# Patient Record
Sex: MALE | Race: WHITE
[De-identification: names, ages, dates, MRNs, and addresses within clinical notes are randomized per-mention and may not be internally consistent; named-entity substitution may affect disease eponyms.]

---

## 2019-04-15 ENCOUNTER — HOSPITAL ENCOUNTER (OUTPATIENT)
Dept: HOSPITAL 92 - ERS | Age: 84
Setting detail: OBSERVATION
LOS: 1 days | Discharge: HOME | End: 2019-04-16
Attending: FAMILY MEDICINE | Admitting: FAMILY MEDICINE
Payer: MEDICARE

## 2019-04-15 VITALS — BODY MASS INDEX: 22.1 KG/M2

## 2019-04-15 DIAGNOSIS — I25.10: ICD-10-CM

## 2019-04-15 DIAGNOSIS — I11.0: ICD-10-CM

## 2019-04-15 DIAGNOSIS — E89.0: ICD-10-CM

## 2019-04-15 DIAGNOSIS — Z87.891: ICD-10-CM

## 2019-04-15 DIAGNOSIS — I45.10: ICD-10-CM

## 2019-04-15 DIAGNOSIS — Z95.2: ICD-10-CM

## 2019-04-15 DIAGNOSIS — Z95.1: ICD-10-CM

## 2019-04-15 DIAGNOSIS — Z95.5: ICD-10-CM

## 2019-04-15 DIAGNOSIS — Z86.73: ICD-10-CM

## 2019-04-15 DIAGNOSIS — Z79.82: ICD-10-CM

## 2019-04-15 DIAGNOSIS — Z79.02: ICD-10-CM

## 2019-04-15 DIAGNOSIS — Z88.2: ICD-10-CM

## 2019-04-15 DIAGNOSIS — R07.89: Primary | ICD-10-CM

## 2019-04-15 DIAGNOSIS — Z79.899: ICD-10-CM

## 2019-04-15 DIAGNOSIS — E78.5: ICD-10-CM

## 2019-04-15 DIAGNOSIS — R74.0: ICD-10-CM

## 2019-04-15 DIAGNOSIS — I25.2: ICD-10-CM

## 2019-04-15 DIAGNOSIS — I50.30: ICD-10-CM

## 2019-04-15 LAB
ALBUMIN SERPL BCG-MCNC: 4.2 G/DL (ref 3.4–4.8)
ALP SERPL-CCNC: 137 U/L (ref 40–150)
ALT SERPL W P-5'-P-CCNC: 90 U/L (ref 8–55)
ANION GAP SERPL CALC-SCNC: 15 MMOL/L (ref 10–20)
AST SERPL-CCNC: 199 U/L (ref 5–34)
BASOPHILS # BLD AUTO: 0.1 THOU/UL (ref 0–0.2)
BASOPHILS NFR BLD AUTO: 0.6 % (ref 0–1)
BILIRUB SERPL-MCNC: 2.1 MG/DL (ref 0.2–1.2)
BUN SERPL-MCNC: 20 MG/DL (ref 8.4–25.7)
CALCIUM SERPL-MCNC: 9.8 MG/DL (ref 7.8–10.44)
CHLORIDE SERPL-SCNC: 104 MMOL/L (ref 98–107)
CK SERPL-CCNC: 41 U/L (ref 30–200)
CO2 SERPL-SCNC: 26 MMOL/L (ref 23–31)
CREAT CL PREDICTED SERPL C-G-VRATE: 0 ML/MIN (ref 70–130)
EOSINOPHIL # BLD AUTO: 0.1 THOU/UL (ref 0–0.7)
EOSINOPHIL NFR BLD AUTO: 1.5 % (ref 0–10)
GLOBULIN SER CALC-MCNC: 3.6 G/DL (ref 2.4–3.5)
GLUCOSE SERPL-MCNC: 122 MG/DL (ref 83–110)
HBSAG INDEX: 0.29 S/CO (ref 0–0.99)
HBV SURFACE AB SERPL IA-ACNC: 17.68 MIU/ML
HEP B CORE TOTAL INDEX: 6.67 S/CO (ref 0–0.79)
HEP C INDEX: 0.15 S/CO (ref 0–0.79)
HGB BLD-MCNC: 15.7 G/DL (ref 14–18)
LIPASE SERPL-CCNC: 40 U/L (ref 8–78)
LYMPHOCYTES # BLD: 2 THOU/UL (ref 1.2–3.4)
LYMPHOCYTES NFR BLD AUTO: 23.1 % (ref 21–51)
MCH RBC QN AUTO: 31.3 PG (ref 27–31)
MCV RBC AUTO: 96.5 FL (ref 78–98)
MONOCYTES # BLD AUTO: 0.9 THOU/UL (ref 0.11–0.59)
MONOCYTES NFR BLD AUTO: 10.5 % (ref 0–10)
NEUTROPHILS # BLD AUTO: 5.6 THOU/UL (ref 1.4–6.5)
NEUTROPHILS NFR BLD AUTO: 64.3 % (ref 42–75)
PLATELET # BLD AUTO: 175 THOU/UL (ref 130–400)
POTASSIUM SERPL-SCNC: 3.8 MMOL/L (ref 3.5–5.1)
RBC # BLD AUTO: 5.01 MILL/UL (ref 4.7–6.1)
SODIUM SERPL-SCNC: 141 MMOL/L (ref 136–145)
WBC # BLD AUTO: 8.7 THOU/UL (ref 4.8–10.8)

## 2019-04-15 PROCEDURE — 36415 COLL VENOUS BLD VENIPUNCTURE: CPT

## 2019-04-15 PROCEDURE — 78452 HT MUSCLE IMAGE SPECT MULT: CPT

## 2019-04-15 PROCEDURE — 86803 HEPATITIS C AB TEST: CPT

## 2019-04-15 PROCEDURE — 99285 EMERGENCY DEPT VISIT HI MDM: CPT

## 2019-04-15 PROCEDURE — G0378 HOSPITAL OBSERVATION PER HR: HCPCS

## 2019-04-15 PROCEDURE — A9500 TC99M SESTAMIBI: HCPCS

## 2019-04-15 PROCEDURE — 86704 HEP B CORE ANTIBODY TOTAL: CPT

## 2019-04-15 PROCEDURE — 96361 HYDRATE IV INFUSION ADD-ON: CPT

## 2019-04-15 PROCEDURE — 80053 COMPREHEN METABOLIC PANEL: CPT

## 2019-04-15 PROCEDURE — 84484 ASSAY OF TROPONIN QUANT: CPT

## 2019-04-15 PROCEDURE — 85025 COMPLETE CBC W/AUTO DIFF WBC: CPT

## 2019-04-15 PROCEDURE — 83880 ASSAY OF NATRIURETIC PEPTIDE: CPT

## 2019-04-15 PROCEDURE — 76700 US EXAM ABDOM COMPLETE: CPT

## 2019-04-15 PROCEDURE — 93017 CV STRESS TEST TRACING ONLY: CPT

## 2019-04-15 PROCEDURE — 86706 HEP B SURFACE ANTIBODY: CPT

## 2019-04-15 PROCEDURE — 93005 ELECTROCARDIOGRAM TRACING: CPT

## 2019-04-15 PROCEDURE — 94760 N-INVAS EAR/PLS OXIMETRY 1: CPT

## 2019-04-15 PROCEDURE — 93306 TTE W/DOPPLER COMPLETE: CPT

## 2019-04-15 PROCEDURE — 83690 ASSAY OF LIPASE: CPT

## 2019-04-15 PROCEDURE — 82550 ASSAY OF CK (CPK): CPT

## 2019-04-15 PROCEDURE — 87340 HEPATITIS B SURFACE AG IA: CPT

## 2019-04-15 NOTE — PDOC.FPRHP
- History of Present Illness


Chief Complaint: Chest pain


History of Present Illness: 


 Mr. Murphy is an 89 yo M with PMH CAD, MI, CVA


He woke up this morning and had breakfast. After breakfast, got in car and 

began to have chest pain around 0830. 


Went ot doctor's office and they recommended he go to ER. Central substernal 

constant chest pain, described as burning. No radiation. Pain lasted about 3 

hours. Denies SOB, dizziness, diaphoresis, swelling. Not associated with 

exertion. Was given nitro paste, GI cocktail at [facility] ED and was 

transferred here as his cardiologist, Dr. Harvey works here. Says chest pain 

resolved in ambulance during transfer.





Cardiologist: Dr. Harvey





ED Course: 





Nitro paste, aspirin, GI cocktail, Lasix 40mg IV








- Allergies/Adverse Reactions


 Allergies











Allergy/AdvReac Type Severity Reaction Status Date / Time


 


Sulfa (Sulfonamide Allergy   Verified 04/15/19 19:24





Antibiotics)     














- Home Medications


 











 Medication  Instructions  Recorded  Confirmed  Type


 


Aspirin [Ecotrin Regular Strength] 325 mg PO DAILY 08/11/15 04/15/19 History


 


Cyanocobalamin (Vitamin B-12) 1,000 mcg PO DAILY 08/11/15 04/15/19 History





[Vitamin B-12]    


 


Lisinopril 10 mg PO DAILY 08/11/15 04/15/19 History


 


Ubidecarenone [Co Q-10] 100 mg PO DAILY 08/11/15 04/15/19 History


 


Amlodipine [Norvasc] 5 mg PO DAILY 07/11/17 04/15/19 History


 


Cholecalciferol (Vitamin D3) 1 cap PO DAILY 07/11/17 04/15/19 History





[Vitamin D]    


 


Clopidogrel Bisulfate [Plavix] 75 mg PO DAILY 07/11/17 04/15/19 History


 


Furosemide [Lasix] 20 mg PO DAILY #30 tab 03/31/18 04/15/19 Rx


 


Atorvastatin Calcium 20 mg PO HS 04/15/19 04/15/19 History


 


Metoprolol Succinate [Toprol XL] 25 mg PO DAILY 04/15/19 04/15/19 History


 


Potassium Chloride 10 meq PO DAILY 04/15/19 04/15/19 History


 


Tolterodine Tartrate [Detrol LA] 4 mg PO DAILY 04/15/19 04/15/19 History














- History


PMHx: HTN, HLD, aortic stenosis, MI s/p stents, multiple CVA


 


PSHx: bilateral cataracts, aortic valve replacement





FHx: Unknown, denies


 


Social: No tobacco, alcohol, drug use. Lives in [place] with wife. 


 








- Review of Systems


General: denies: fever/chills, weight/appetite/sleep changes


ENT: denies: nasal congestion


Respiratory: denies: cough, shortness of breath


Cardiovascular: reports: chest pain.  denies: edema, orthopnea


Gastrointestinal: denies: nausea, vomiting, diarrhea, constipation


Genitourinary: denies: dysuria


Skin: denies: rashes


Musculoskeletal: denies: pain, swelling


Neurological: denies: numbness, syncope





- Vital signs








BP: 112/68, Pulse: 76, Resp: 16, Pain: 0, O2 sat: 97 on 3L Oxygen





- Physical Exam


Constitutional: NAD


HEENT: normocephalic and atraumatic, grossly normal vision, grossly normal 

hearing


Neck: supple, trachea midline, no bruits


Chest: no-tender to palpation


Heart: RRR, normal S1/S2, pulses present, no edema


Lungs: CTAB, no respiratory distress


Abdomen: soft, non-tender, bowel sounds present


Musculoskeletal: normal structure, normal tone


Neurological: no focal deficit, other (BLE and BUE 5/5 muscle strength)


Skin: no rash/lesions, good turgor


Heme/Lymphatic: no unusual bruising or bleeding


Psychiatric: normal mood and affect





FMR H&P: Results





- Labs


Result Diagrams: 


 04/15/19 15:39





 04/15/19 15:39


Lab results: 


 











WBC  8.7 thou/uL (4.8-10.8)   04/15/19  15:39    


 


Hgb  15.7 g/dL (14.0-18.0)   04/15/19  15:39    


 


Hct  48.4 % (42.0-52.0)   04/15/19  15:39    


 


MCV  96.5 fL (78.0-98.0)   04/15/19  15:39    


 


Plt Count  175 thou/uL (130-400)   04/15/19  15:39    


 


Neutrophils %  64.3 % (42.0-75.0)   04/15/19  15:39    


 


Sodium  141 mmol/L (136-145)   04/15/19  15:39    


 


Potassium  3.8 mmol/L (3.5-5.1)   04/15/19  15:39    


 


Chloride  104 mmol/L ()   04/15/19  15:39    


 


Carbon Dioxide  26 mmol/L (23-31)   04/15/19  15:39    


 


BUN  20 mg/dL (8.4-25.7)   04/15/19  15:39    


 


Creatinine  1.07 mg/dL (0.7-1.3)   04/15/19  15:39    


 


Glucose  122 mg/dL ()  H  04/15/19  15:39    


 


Calcium  9.8 mg/dL (7.8-10.44)   04/15/19  15:39    


 


Total Bilirubin  2.1 mg/dL (0.2-1.2)  H  04/15/19  15:39    


 


AST  199 U/L (5-34)  H  04/15/19  15:39    


 


ALT  90 U/L (8-55)  H  04/15/19  15:39    


 


Alkaline Phosphatase  137 U/L ()   04/15/19  15:39    


 


Creatine Kinase  41 U/L ()   04/15/19  15:39    


 


B-Natriuretic Peptide  578.7 pg/mL (0-100)  H  04/15/19  15:39    


 


Serum Total Protein  7.8 g/dL (5.8-8.1)   04/15/19  15:39    


 


Albumin  4.2 g/dL (3.4-4.8)   04/15/19  15:39    


 


Lipase  40 U/L (8-78)   04/15/19  15:39    














FMR H&P: A/P





- Problem List


(1) Chest pain


Current Visit: Yes   Status: Acute   Code(s): R07.9 - CHEST PAIN, UNSPECIFIED   

Comment: Pt appears to have had an old PE, also has RV dilatation on 2D echo.  

recent echo done in Feb 2018 at his cardiologist's office showed normal RV.  

Pulmonology consulted for opinion and help with management.   





(2) CAD (coronary artery disease)


Current Visit: Yes   Status: Chronic   Code(s): I25.10 - ATHSCL HEART DISEASE 

OF NATIVE CORONARY ARTERY W/O ANG PCTRS   Comment: stable   





(3) H/O aortic valve replacement


Current Visit: Yes   Status: Chronic   Code(s): Z95.2 - PRESENCE OF PROSTHETIC 

HEART VALVE   Comment: stable   





(4) HLD (hyperlipidemia)


Current Visit: Yes   Status: Chronic   Code(s): E78.5 - HYPERLIPIDEMIA, 

UNSPECIFIED   Comment: on statin   





(5) HTN (hypertension)


Current Visit: Yes   Status: Chronic   Code(s): I10 - ESSENTIAL (PRIMARY) 

HYPERTENSION   


Qualifiers: 


   Hypertension type: essential hypertension   Qualified Code(s): I10 - 

Essential (primary) hypertension   


Comment: Continue to monitor vital signs and titrate antihypertensives as 

needed   





(6) Transaminitis


Current Visit: Yes   Status: Acute   Code(s): R74.0 - NONSPEC ELEV OF LEVELS OF 

TRANSAMNS & LACTIC ACID DEHYDRGNSE   





- Plan





Atypical chest pain, ACS rule out


- Heart score 6


- CP resolved in ambulance to our ED (received nitro, asa and GI cocktail)


- troponin negative x2, continue to trend


- EKG in our ED showed T wave inversion V1-3


- plan for stress test in am


- will discuss case with cardiology in morning





CAD, MI s/p stents


- continue home aspirin, clopidegrel





Valvular disease


- s/p aortic valve replacement


- last echo 03/2018 EF 60-65%, high pulm artery pressure, R ventricular 

enlargement


- 03/18 VASYL ruptured mitral chorda tendonae with severe mitral regurgitation





Elevated BNP


- , (1100 one year ago)


- s/p 40 mg IV lasix in ED, does not appear volume overloaded at this time, 

asymptomatic. Will monitor for signs of volume overload. 





Transaminitis


- , ALT 90, T bili 2.1 - never elevated on previous labs in the system


- will get hepatitis panel and RUQ US





HTN


- continue home lisinopril. Hold metoprolol for stress.





HLD


- continue home atorvastatin











Diet: HH/NPO


Ppx: Lovenox


Dispo: admit to telemetry for observation, likely d/c in 48 hrs





PCP: Heidi


Cardiologist: Wes





Case discussed with Dr. Kovacs.








FMR H&P: Upper Level





- Pertinent history





89 yo male presenting from outside ER with chest pain starting today after 

breakfast. Pt is poor historian and much of PMH comes from past records. Hx of 

HFpEF, severe mitral regurg, severely elevated pulm artery pressures, HTN, 

dyslipidemia, CAD with CABG x 1 in 8/2015, aortic stenosis with aortic valve 

replacement in 8/2015 with Magna bioprosthetic valve. Pt reports he had 

substernal chest pain , constant, unable to describe, with no radiation. Went 

to doctors office who told him to go to ED. Was transported to [facility] ER via 

ambulance and CP resolved in transit. Pt denies SOB, diaphoresis, recent GORE, 

leg swelling, orthopnea. Also describes hx of stent placement approx. 3 months 

ago. Cardiologist is Dr. Harvey. Also had hx of aortic valve replacement per 

patient history.











- Pertinent findings





90/59


HR: 74


RR: 24


100% on RA





ECHO on 3/2018: EF 60-65% with severely dilated left atrium, severe mitral 

regurg, mitral valve prolapse, moderate tricuspid regurg, pulm artery systolic 

pressure approx. 60mmHg and gradients within normal range for prosthetic aortic 

valve


EKG: T wave inversions





GEN: NAD, oriented to person, location, day of week, but not month or year


CARD: RRR, JESSICA 4/6


PULM: CTAB


ABD: soft, nontender


EXT: no cyanosis or edema





All labs reviewed and noted in Dr. Mccarthy note











- Plan


Date/Time: 04/15/19 1644








IAd DO, have evaluated this patient and agree with findings/plan as 

outlined by intern resident. Pertinent changes/additions are listed here.





#atypical chest pain


-heart score of 6


-neg trops initially, will continue to trend


-discuss with cardiologist in the AM, decide whether to get ECHO vs stress


-check Mg, Phos, TSH


- Dx includes GERD, costochondritis


-admit to tele





#elevated liver enzymes


-new onset


-check RUQ US, GGT, hep panel





#HTN


#Hx of CABG, aortic valve replacement, stents


-discuss with cardiology tomorrow





Addendum - Attending





- Attending Attestation


Date/Time: 04/16/19 0101





I personally evaluated the patient and discussed the management with Dr. Hernandez


I agree with the History, Examination, Assessment and Plan documented above 

with any addition or exceptions noted below.


Pleasant 89 yo male with episode angina earlier today spontaneously resolved 

questionably after GI cocktail given at outside hospital s/pAVR 2 v CABG 

patient poor historian admitted r/o ACS troponins negative will consult with Dr Harvey in am for further consideration

## 2019-04-16 VITALS — SYSTOLIC BLOOD PRESSURE: 125 MMHG | DIASTOLIC BLOOD PRESSURE: 65 MMHG

## 2019-04-16 VITALS — TEMPERATURE: 97.8 F

## 2019-04-16 LAB
ALBUMIN SERPL BCG-MCNC: 3.4 G/DL (ref 3.4–4.8)
ALP SERPL-CCNC: 137 U/L (ref 40–150)
ALT SERPL W P-5'-P-CCNC: 135 U/L (ref 8–55)
ANION GAP SERPL CALC-SCNC: 11 MMOL/L (ref 10–20)
AST SERPL-CCNC: 164 U/L (ref 5–34)
BILIRUB SERPL-MCNC: 1 MG/DL (ref 0.2–1.2)
BUN SERPL-MCNC: 19 MG/DL (ref 8.4–25.7)
CALCIUM SERPL-MCNC: 9 MG/DL (ref 7.8–10.44)
CHLORIDE SERPL-SCNC: 106 MMOL/L (ref 98–107)
CO2 SERPL-SCNC: 24 MMOL/L (ref 23–31)
CREAT CL PREDICTED SERPL C-G-VRATE: 52 ML/MIN (ref 70–130)
GLOBULIN SER CALC-MCNC: 3.5 G/DL (ref 2.4–3.5)
GLUCOSE SERPL-MCNC: 91 MG/DL (ref 83–110)
POTASSIUM SERPL-SCNC: 4 MMOL/L (ref 3.5–5.1)
SODIUM SERPL-SCNC: 137 MMOL/L (ref 136–145)
TROPONIN I SERPL DL<=0.01 NG/ML-MCNC: 0.02 NG/ML (ref ?–0.03)

## 2019-04-16 NOTE — ULT
ULTRASOUND ABDOMEN COMPLETE:

 

INDICATIONS:

Transaminitis.

 

TECHNIQUE:

Gray-scale ultrasound evaluation of the liver, gallbladder, spleen, pancreas, common bile duct, kidne
ys, abdominal aorta, and inferior vena cava (IVC).

 

FINDINGS:

No focal hepatic lesion.  The common duct measures 6 mm. which is normal for the patient's age.  Subt
le, low-level echoes of the gallbladder lumen may relate to sludge/non-shadowingstones.  There is no 
overt hydronephrosis of the kidneys.  Hypoechoic foci within each kidney indicate bilateral renal cys
t formation.  No focal abnormality of the spleen is seen.  The pancreas is partially obscured by tanner
l content, which does limit assessment.  Imaged abdominal aorta is nonaneurysmal.  No ascites.

 

IMPRESSION:

1.  No acute abnormalities identified.

 

2.  Probable small volume gallbladder sludge/nonshadowing stones.

 

3.  Bilateral renal cyst formation.  The largest documented cyst is involving the left kidney, slight
ly greater than 7 cm in diameter.

 

POS: NWK

## 2019-04-16 NOTE — PDOC.FM
- Subjective


Subjective: 





Mr. Murphy denies any chest pain, SOB. Resting well in bed with wife at bedside.








- Objective


MAR Reviewed: Yes


Vital Signs & Weight: 


 Vital Signs (12 hours)











  Temp Pulse Resp BP BP Pulse Ox


 


 04/16/19 05:43   71   103/56 L  


 


 04/16/19 04:09  98.7 F  64  18   84/54 L  93 L


 


 04/15/19 19:20       96








 Weight











Weight                         69.49 kg














I&O: 


 











 04/14/19 04/15/19 04/16/19





 06:59 06:59 06:59


 


Intake Total   795


 


Output Total   320


 


Balance   475











Result Diagrams: 


 04/15/19 15:39





 04/16/19 04:20





Phys Exam





- Physical Examination


Constitutional: NAD


Respiratory: clear to auscultation bilateral


Cardiovascular: RRR


click and blowing pansystolic murmur


Gastrointestinal: soft, non-tender


Musculoskeletal: no edema


Neurological: non-focal





Dx/Plan


(1) Chest pain


Code(s): R07.9 - CHEST PAIN, UNSPECIFIED   Status: Acute   





(2) CAD (coronary artery disease)


Code(s): I25.10 - ATHSCL HEART DISEASE OF NATIVE CORONARY ARTERY W/O ANG PCTRS 

  Status: Chronic   





(3) H/O aortic valve replacement


Code(s): Z95.2 - PRESENCE OF PROSTHETIC HEART VALVE   Status: Chronic   





(4) HLD (hyperlipidemia)


Code(s): E78.5 - HYPERLIPIDEMIA, UNSPECIFIED   Status: Chronic   





(5) HTN (hypertension)


Code(s): I10 - ESSENTIAL (PRIMARY) HYPERTENSION   Status: Chronic   


Qualifiers: 


   Hypertension type: essential hypertension   Qualified Code(s): I10 - 

Essential (primary) hypertension   





(6) Transaminitis


Code(s): R74.0 - NONSPEC ELEV OF LEVELS OF TRANSAMNS & LACTIC ACID DEHYDRGNSE   

Status: Acute   





- Plan


Plan: 





Atypical chest pain, ACS rule out


- Heart score 6


- CP resolved in ambulance to our ED (received nitro, asa and GI cocktail)


- troponin negative x3


- EKG in our ED showed T wave inversion V1-3


- plan for stress test in am


- Cardiology, Dr. Harvey consulted





CAD, MI s/p stents


- continue home aspirin, clopidegrel





Valvular disease


- s/p aortic valve replacement


- last echo 03/2018 EF 60-65%, high pulm artery pressure, R ventricular 

enlargement


- 03/18 VASYL ruptured mitral chorda tendonae with severe mitral regurgitation





Elevated BNP


- , (1100 one year ago)


- s/p 40 mg IV lasix in ED, does not appear volume overloaded at this time, 

asymptomatic. 


- continue home lasix





Transaminitis


- , ALT 90, T bili 2.1 - never elevated on previous labs in the system


- Hepatitis panel suggests Hepatitis B in the past but infection cleared. US 

pending.





HTN


- continue home lisinopril. Hold metoprolol for stress.





HLD


- continue home atorvastatin











Diet: HH/NPO


Ppx: Lovenox


Dispo: pending workup











Addendum - Attending





- Attending Attestation


Date/Time: 04/16/19 0218





I personally evaluated the patient and discussed the management with Dr. Hernandez.


I agree with the History, Examination, Assessment and Plan documented above 

with any addition or exceptions noted below.


Atpical CP- Known CAD  EKGs reviewed showing new RBBB changed since 2015.  

Stress test ordered but not done until Dr. Harvey approves (secondary to new 

EKG changes).  Appreciate his recs


h/o AVR and ruptured cordea tendonae- ECHO done this am. Pending read


Transaminitis-improved- hep panel with no acute  infection and RUQ u/s normal.  

Possibly secondary to statin use (however benefit outweights risk of mild 

elevation).  Will trend in outpatient setting.

## 2019-04-16 NOTE — NM
EXAM: CARDIAC SPECT



HISTORY: Chest pain, coronary artery disease, hypertension, dyslipidemia



TECHNIQUE: A myocardial perfusion scan was performed using the single isotope 1 day protocol with charmaine
hnetium 99m sestamibi. [10 mCi] was injected intravenously for the rest exam followed by 30 mCi for

the stress study. 



Pharmacologic stress with adenosine was monitored and interpreted by Dr. Little



FINDINGS: Homogeneous tracer distribution is seen in the myocardial segments on stress and rest image
s without fixed or reversible defects.





Gated SPECT LVEF: 73%



Wall motion exam: Normal





IMPRESSION: Normal myocardial perfusion scan



Reported By: Mychal Arshad 

Electronically Signed:  4/16/2019 3:20 PM

## 2019-04-16 NOTE — CON
DATE OF CONSULTATION:  



HISTORY:  Sebastián Murphy is a pleasant 88-year-old white male, initially evaluated in

August 2008.  At that time, he developed shingles and was going to undergo

acupuncture.  It was noted that he had a heart murmur as well as a carotid bruit.

He denied any chest, arm, neck or jaw discomfort, exertional shortness of breath, or

syncope.  He did complain of some fatigue.  Echo when he was initially evaluated

revealed a peak gradient of 46 mm with a mean gradient of 26 mm.  Carotid Doppler

revealed minimal atherosclerotic plaque, but no significant stenosis.  It was felt

that he had mild-to-moderate aortic stenosis. 

He continued to be followed every 6 months with echos every 1 to 1-1/2 years.  In

February 2010, he did have a left frontal CVA that affected his memory.  He did not

have any arm or leg weakness.  Aspirin was increased from 81 mg to 325 mg daily and

Plavix 75 was added.  His aortic valve continued to worsen and in August 2014,

ejection fraction was 55% to 60% with mild concentric left ventricular hypertrophy.

He had severe aortic stenosis with a peak gradient of 76 mm, mean gradient of 44 mm,

and an aortic valve area of 0.5 cm2.  He continued to deny any chest discomfort,

shortness of breath, PND, or exertional syncope, although he did state that he

continued to have worsening fatigue. 



In August 2015, he developed vertiginous symptoms, feeling that the room was

spinning.  He did not have any nausea.  He went to see Dr. Gordon and was sent to

the lab.  When he went to have blood work done, he leaned over to sign in,

apparently had a falling episode.  He remembered falling and hitting the floor and

it did not sound as if he had a true syncopal episode.  It was felt at that time

that his symptoms were due to acute labyrinthitis.  However, with his worsening

aortic stenosis, it was recommended that he undergo cardiac catheterization.

Carotid Dopplers were performed, which revealed no hemodynamically significant

stenosis.  At catheterization, he had ejection fraction of 50% to 55% with severe

mitral regurgitation.  Aortic root injection revealed mild aortic insufficiency.

The mean aortic gradient was 63 mm with aortic valve area of 0.62 cm2.  Coronary

angiography revealed a 40% to 50% proximal LAD lesion.  There was a 70% ostial right

coronary artery lesion with a pressure damping of 30 mm.  He then underwent CABG x1

with saphenous vein graft to distal right coronary artery and aortic valve

replacement with a #23 Magna bioprosthetic valve by Dr. Ciaran Hobson.  He did

develop postoperative hemorrhage and had to return to the operating room.  There was

2700 mL of blood and clot within the right chest, which was evacuated.  No bleeding

source was identified within the chest or the mediastinum, and no further hemorrhage

was noted.  The remainder of his hospital stay was unremarkable.  In July 2017, he

was admitted with respiratory failure with hypoxia and community-acquired pneumonia.

 He underwent CT angiogram, which revealed no evidence of pulmonary embolism. 



In March 2018, he was admitted with increasing lower extremity edema.  His ejection

fraction was 60% to 65%.  However, he did have severe mitral regurgitation.  He was

diuresed.  He was on Lasix 40 mg daily when he was admitted; however, he was sent

home only on 20 mg daily.  He presented to the office complaining of dyspnea on

exertion walking in the house, but denied any PND or orthopnea.  He was placed back

on furosemide 40 mg q.a.m.  It was felt that his dyspnea is probably due to severe

mitral regurgitation and Lasix was further increased to 40 q.a.m. and 20 q.p.m.

With his severe mitral regurgitation, he underwent transesophageal echo on April 09, 2018.  This revealed ruptured chordae tendineae of the posterior leaflet and severe

mitral regurgitation.  He never did return for followup after the transesophageal

echo. 



He apparently has continued to do well, and denies any shortness of breath or chest

discomfort until yesterday.  Yesterday after eating breakfast, he developed

substernal chest pressure that lasted 3 or 4 hours.  He went to the emergency room

in [place] and nothing specific was found.  It sounds as if he was given some

type of antacid right prior to get on the ambulance for transfer and while being

transferred here, his pain dramatically resolved.  Cardiac enzymes are totally

normal despite for 3 or 4 hours of pain.  Cardiolite has been normal. 



PAST MEDICAL HISTORY:  Hypertension, hyperlipidemia, history of CVA in 2010 with

Plavix added to the aspirin, coronary artery disease. 



OPERATIONS:  CABG x1 to the right coronary artery and aortic valve replacement and

cataracts as well as partial thyroidectomy. 



MEDICATIONS:  

1. Amlodipine 5 mg daily.

2. Aspirin 325 daily.

3. Plavix 75 mg daily.

4. Atorvastatin 20 q.h.s.

5. Vitamin B12.

6. Furosemide 20 mg daily.

7. Lisinopril 10 mg daily.

8. Metoprolol 25 mg daily.

9. Nitroglycerin p.r.n.

10. Lisinopril 10 daily.

11. Potassium chloride 10 mEq daily.

12. Detrol 4 mg daily.



ALLERGIES:  HE STATES THAT HE DEVELOPED A RASH AFTER EATING SHRIMP MANY YEARS AGO,

BUT HE HAS ALSO UNDERGONE NUMEROUS RADIOLOGICAL PROCEDURES WITHOUT PREMEDICATION FOR

IODINE AND I DOUBT THAT HE IS ALLERGIC TO IV CONTRAST.  SULFA DRUGS. 



SOCIAL HISTORY:  He smoked less than a pack per day, but stopped in the 1960s.  He

does not drink alcohol.  He is retired from work of [employer]. 



FAMILY HISTORY:  Sister had sudden death at age 70 for unknown reasons.



REVIEW OF SYSTEMS:  A 10-point review of systems is otherwise unremarkable.



PHYSICAL EXAMINATION:

VITAL SIGNS:  Blood pressure 125/65, pulse of 72. 

HEENT:  PERRL. 

NECK:  Supple. 

CHEST:  Clear. 

CARDIAC:  S1 and S2 normal without any S3 or S4.  There is a 2/6 systolic ejection

murmur in the aortic area and at the apex, 2/6 holosystolic murmur. 

ABDOMEN:  Normal bowel sounds without tenderness.  Specifically, there is no right

upper quadrant tenderness. 

EXTREMITIES:  Reveal no edema. 

NEUROLOGIC:  Grossly intact. 

SKIN:  Warm and dry.



LABORATORY DATA:  EKG reveals normal sinus rhythm with right bundle-branch block.

Inverted T-wave, V1 through V3, which is similar to the last EKG in the office.

Echocardiogram revealed normal left ventricular function with ejection fraction 60%

to 65%, moderately enlarged right ventricle, severely dilated left atrium, prolapse

of the anterior leaflet of the mitral valve, severe mitral regurgitation, normally

functioning bioprosthetic valve in the aortic position, severe mitral regurgitation,

and moderate pulmonic regurgitation.  Abdominal ultrasound revealed small volume

gallbladder with sludge, bilateral renal cysts.  Adenosine Cardiolite revealed

ejection fraction of 73% with normal wall motion and normal myocardial perfusion.

Hemoglobin 15.7, hematocrit 48.4, white count 8700, platelets 175,000.  D-dimer

2.14.  Sodium 137, potassium 4.0, chloride 106, carbon dioxide 24, BUN 19,

creatinine 0.97.  , ALT 90 yesterday.  Today, , .  Alkaline

phosphatase has remained normal at 137.  Troponin I has been normal x2. 



IMPRESSION:  

1. Noncardiac chest pain.  He does have elevated liver function test.  However, his

alkaline phosphatase remains normal and gallbladder appears small with sludge and

small stones. 

2. Status post CABG x1 to the right coronary artery and aortic valve replacement.

Cardiolite scan reveals no evidence of ischemia. 

3. Valvular heart disease, status post aortic valve replacement and also severe

mitral and tricuspid regurgitations, which are not new findings.  He did undergo

transesophageal echo in April 2018, which revealed severe mitral regurgitation.

However, he has never returned for followup after that. 

4. History of heart failure in July 2017.

5. Hypertension.

6. Hypercholesterolemia.

7. History of cerebrovascular accident in 2010.

8. Positive family history.



PLAN:  Mr. Murphy's current episode of chest discomfort does not appear to be

cardiac in nature.  It certainly could have been due to passing a gallstone with his

elevated liver function tests, although the alkaline phosphatase is not elevated.

From a cardiac standpoint, I do not feel any further cardiac evaluation is warranted

at this time.  He does have significant mitral regurgitation.  However, with his

advanced age, approaching repair of that would probably be something that he never

recover from.  Consideration could be given to the mitral valve clip.  He also is on

20 mg of Lasix.  However, last time he was seen, he was on 40 mg and I do feel that

this should be increased back to 40 mg.  Fasting lipid profile will be obtained in

the morning and repeat liver function test.  However, at this time from a cardiac

standpoint, I do not feel that any further evaluation is warranted. 







Job ID:  [id number]

## 2019-04-17 NOTE — DIS
DATE OF ADMISSION:  04/15/2019



DATE OF DISCHARGE:  04/16/2019



RESIDENT:  Dr. Joann Hernandez.



ADMITTING ATTENDING:  Dr. Kovacs.



CONSULTS:  Cardiology, Dr. Harvey.



PROCEDURES:  

1. On 04/16/2019, stress nuclear medicine test showed normal myocardial perfusion

scan. 

2. On 04/16/2019, echocardiogram showed ejection fraction of 60 to 65%, moderately

enlarged right ventricle, severely dilated left atrium, moderate late systolic

prolapse of anterior leaflet of mitral valve, severe mitral regurgitation, normally

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

moderate pulmonic regurgitation present. 

3. On 04/16/2019, abdominal ultrasound showed no acute abnormalities, probable small

volume gallbladder sludge/non shadowing stones, bilateral renal cyst formation, the

largest documented cyst is involving the left kidney, slightly greater than 7 cm in

diameter. 



PRIMARY DIAGNOSES:  

1. Atypical chest pain, acute coronary syndrome rule out.

2. Valvular disease.

3. Elevated BNP.

4. Transaminitis.



SECONDARY DIAGNOSES:  

1. Hypertension.

2. Hyperlipidemia.

3. Coronary artery disease.

4. Myocardial infarction, status post stents.



DISCHARGE MEDICATIONS:  

1. Vitamin B12 1000 mcg p.o. daily.

2. CoQ10 100 mg p.o. daily.

3. Aspirin 325 mg p.o. daily.

4. Lisinopril 10 mg p.o. daily.

5. Amlodipine 5 mg p.o. daily.

6. Clopidogrel 75 mg p.o. daily.

7. Vitamin D3 one capsule p.o. daily 1000 units.

8. Furosemide 20 mg p.o. daily.

9. Atorvastatin 20 mg p.o. h.s.

10. Tolterodine tartrate 4 mg p.o. daily.

11. Potassium chloride 10 mEq p.o. daily.

12. Metoprolol succinate (Toprol-XL) 25 mg p.o. daily.



HISTORY OF PRESENT ILLNESS:  An 88-year-old male with past medical history of

coronary artery disease, CVA, MI, and aortic valve replacement, presented with

evaluation of chest pain.  The patient was initially seen at [facility] Emergency

Department and was transferred here as his cardiologist, Dr. Harvey who is here.

Chest pain resolved in the ambulance during this transfer.  At the outside ER, he

received a GI cocktail, nitroglycerin.  The patient was admitted for atypical chest

pain and ACS rule out.  Heart Score 6.  Troponins were negative.  EKG showed T-wave

inversion in V1 through 3.  The patient was otherwise continued on home medications

apart from beta blocker.  Cardiology was consulted and Dr. Harvey saw him.  He

believes that Mr. Murphy's current episode of chest discomfort did not appear to be

cardiac in nature.  Could have been due to passing away gallstone considering

elevated liver function test.  However, alkaline phosphatase was not elevated.  He

did not feel any further cardiac evaluation was warranted.  He noted that patient

does have significant mitral regurgitation.  However, with advanced age, repair of

this would probably be something that he would never recover from.  Consideration

could be given to the mitral valve clip.  He recommended the patient to take Lasix

40 mg daily.  Of note, the patient's blood pressure at times during hospitalization

was 98/54.  For this reason, blood pressure medications were held morning of

discharge. 



LABORATORY DATA:  Notable labs include  and  at the time of discharge.

 Hepatitis C antibody negative.  Hepatitis B studies suggest prior hepatitis B

infection that has now cleared. 



DISPOSITION:  Stable.



DISCHARGE INSTRUCTIONS:  

1. Location, home.

2. Diet, heart healthy.

3. Activity, as tolerated.

4. Follow up with PCP, Dr. Heidi Cuenca in 7 days.

5. Follow up with Dr. Harvey in 3 to 4 weeks.







Job ID:  [id number]